# Patient Record
Sex: FEMALE | HISPANIC OR LATINO
[De-identification: names, ages, dates, MRNs, and addresses within clinical notes are randomized per-mention and may not be internally consistent; named-entity substitution may affect disease eponyms.]

---

## 2022-04-04 PROBLEM — Z00.00 ENCOUNTER FOR PREVENTIVE HEALTH EXAMINATION: Status: ACTIVE | Noted: 2022-04-04

## 2022-04-06 ENCOUNTER — APPOINTMENT (OUTPATIENT)
Dept: SLEEP CENTER | Facility: HOME HEALTH | Age: 32
End: 2022-04-06
Payer: COMMERCIAL

## 2022-04-06 ENCOUNTER — OUTPATIENT (OUTPATIENT)
Dept: OUTPATIENT SERVICES | Facility: HOSPITAL | Age: 32
LOS: 1 days | End: 2022-04-06
Payer: COMMERCIAL

## 2022-04-06 PROCEDURE — 95800 SLP STDY UNATTENDED: CPT

## 2022-04-06 PROCEDURE — 95800 SLP STDY UNATTENDED: CPT | Mod: 26

## 2022-04-07 DIAGNOSIS — G47.33 OBSTRUCTIVE SLEEP APNEA (ADULT) (PEDIATRIC): ICD-10-CM

## 2023-01-13 ENCOUNTER — EMERGENCY (EMERGENCY)
Facility: HOSPITAL | Age: 33
LOS: 1 days | Discharge: HOME | End: 2023-01-13
Admitting: STUDENT IN AN ORGANIZED HEALTH CARE EDUCATION/TRAINING PROGRAM
Payer: OTHER MISCELLANEOUS

## 2023-01-13 VITALS
DIASTOLIC BLOOD PRESSURE: 72 MMHG | HEART RATE: 80 BPM | RESPIRATION RATE: 18 BRPM | TEMPERATURE: 97 F | WEIGHT: 119.93 LBS | SYSTOLIC BLOOD PRESSURE: 107 MMHG | OXYGEN SATURATION: 100 %

## 2023-01-13 DIAGNOSIS — M54.10 RADICULOPATHY, SITE UNSPECIFIED: ICD-10-CM

## 2023-01-13 DIAGNOSIS — R82.998 OTHER ABNORMAL FINDINGS IN URINE: ICD-10-CM

## 2023-01-13 DIAGNOSIS — M54.50 LOW BACK PAIN, UNSPECIFIED: ICD-10-CM

## 2023-01-13 DIAGNOSIS — M79.652 PAIN IN LEFT THIGH: ICD-10-CM
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
## 2023-01-13 DIAGNOSIS — G89.11 ACUTE PAIN DUE TO TRAUMA: ICD-10-CM
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
## 2023-01-13 DIAGNOSIS — S32.009A UNSPECIFIED FRACTURE OF UNSPECIFIED LUMBAR VERTEBRA, INITIAL ENCOUNTER FOR CLOSED FRACTURE: ICD-10-CM

## 2023-01-13 LAB
APPEARANCE UR: CLEAR — SIGNIFICANT CHANGE UP
BACTERIA # UR AUTO: ABNORMAL /HPF
BILIRUB UR-MCNC: NEGATIVE — SIGNIFICANT CHANGE UP
COLOR SPEC: YELLOW — SIGNIFICANT CHANGE UP
DIFF PNL FLD: ABNORMAL
EPI CELLS # UR: SIGNIFICANT CHANGE UP /HPF (ref 0–5)
GLUCOSE UR QL: NEGATIVE — SIGNIFICANT CHANGE UP
KETONES UR-MCNC: 15 MG/DL
LEUKOCYTE ESTERASE UR-ACNC: ABNORMAL
NITRITE UR-MCNC: POSITIVE
PH UR: 6 — SIGNIFICANT CHANGE UP (ref 5–8)
PROT UR-MCNC: NEGATIVE MG/DL — SIGNIFICANT CHANGE UP
RBC CASTS # UR COMP ASSIST: ABNORMAL /HPF
SP GR SPEC: 1.02 — SIGNIFICANT CHANGE UP (ref 1–1.03)
UROBILINOGEN FLD QL: 0.2 E.U./DL — SIGNIFICANT CHANGE UP
WBC UR QL: ABNORMAL /HPF

## 2023-01-13 PROCEDURE — 72100 X-RAY EXAM L-S SPINE 2/3 VWS: CPT | Mod: 26

## 2023-01-13 PROCEDURE — 99284 EMERGENCY DEPT VISIT MOD MDM: CPT

## 2023-01-13 PROCEDURE — 99282 EMERGENCY DEPT VISIT SF MDM: CPT

## 2023-01-13 PROCEDURE — 73502 X-RAY EXAM HIP UNI 2-3 VIEWS: CPT | Mod: 26,LT

## 2023-01-13 PROCEDURE — 99285 EMERGENCY DEPT VISIT HI MDM: CPT

## 2023-01-13 PROCEDURE — 72100 X-RAY EXAM L-S SPINE 2/3 VWS: CPT

## 2023-01-13 PROCEDURE — 81001 URINALYSIS AUTO W/SCOPE: CPT

## 2023-01-13 PROCEDURE — 73502 X-RAY EXAM HIP UNI 2-3 VIEWS: CPT

## 2023-01-13 RX ORDER — LIDOCAINE 4 G/100G
1 CREAM TOPICAL ONCE
Refills: 0 | Status: COMPLETED | OUTPATIENT
Start: 2023-01-13 | End: 2023-01-13

## 2023-01-13 RX ORDER — OXYCODONE AND ACETAMINOPHEN 5; 325 MG/1; MG/1
1 TABLET ORAL
Qty: 20 | Refills: 0
Start: 2023-01-13

## 2023-01-13 RX ORDER — OXYCODONE AND ACETAMINOPHEN 5; 325 MG/1; MG/1
1 TABLET ORAL ONCE
Refills: 0 | Status: DISCONTINUED | OUTPATIENT
Start: 2023-01-13 | End: 2023-01-13

## 2023-01-13 RX ADMIN — OXYCODONE AND ACETAMINOPHEN 1 TABLET(S): 5; 325 TABLET ORAL at 12:11

## 2023-01-13 RX ADMIN — LIDOCAINE 1 PATCH: 4 CREAM TOPICAL at 12:11

## 2023-01-13 RX ADMIN — OXYCODONE AND ACETAMINOPHEN 1 TABLET(S): 5; 325 TABLET ORAL at 14:51

## 2023-01-13 NOTE — ED PROVIDER NOTE - CLINICAL SUMMARY MEDICAL DECISION MAKING FREE TEXT BOX
Mechanical fall with low back injury.  No head strike/headache or other suggestion of head injury, no neck pain or tenderness to suggest cervical injury.  No evidence of thoracoabdominal injuries.  Direct trauma to low back with left paraspinal tenderness, XR c/w transverse processes fractures. Neurologically intact. Neurosurg evaluated and cleared for DC with pain control.      UA was not ordered by me (RN ordered) as pt has no UTI symptoms, but the UA was positive for nit and LE, 5-10 WBC.  Preg test neg. Pt admits to some whitish vaginal DC that just started today and she thinks it is the beginning of a yeast infection. She declined a pelvic exam as she has severe back pain from her fractures, and she is not concerned about having an STI; she says she will self-treat with Monistat and follow up with her gynecologist.

## 2023-01-13 NOTE — ED PROVIDER NOTE - PATIENT PORTAL LINK FT
You can access the FollowMyHealth Patient Portal offered by [facility] by registering at the following website: [email or web address]. By joining [employer]’s FollowMyHealth portal, you will also be able to view your health information using other applications (apps) compatible with our system.

## 2023-01-13 NOTE — ED PROVIDER NOTE - PHYSICAL EXAMINATION
CONSTITUTIONAL: NAD   SKIN: Normal color and turgor.    HEAD: NC/AT.  EYES: Conjunctiva clear. EOMI. PERRL.    ENT: Airway clear. Normal voice.   RESPIRATORY:  Normal work of breathing. Lungs CTAB.  CARDIOVASCULAR:  RRR, S1S2. No M/R/G.      GI:  Abdomen soft, nontender.    MSK: Neck supple. No midline neck tenderness.  + left sided paraspinal lumbar tenderness.  difficutly flexing left hip due to back pain, minimal hip discomfort.    NEURO: Alert; CN: grossly intact. Speech clear.  5/5 strength, SILT. Gait antalgic.

## 2023-01-13 NOTE — ED PROVIDER NOTE - NSFOLLOWUPINSTRUCTIONS_ED_ALL_ED_FT
Use Monistat for possible yeast infection. Follow up with your gynecologist.  Follow up with spine surgeon DR BARTON next week, call today to schedule.    Return to the Emergency Department if you have any new or worsening symptoms, or if you have any concerns.  ==================    Transverse Process Fracture    WHAT YOU NEED TO KNOW:    A transverse process fracture is a break or crack in the back part of a vertebra. Your healthcare provider will give you specific instructions. This depends on where the fracture happened and how severe it is.      DISCHARGE INSTRUCTIONS:    Call your local emergency number (911 in the ) or have someone else call if:   •You feel lightheaded, short of breath, or have chest pain.      •You cough up blood.      •You cannot move your arms or legs.      Return to the emergency department if:   •Your arm or leg feels warm, tender, and painful. It may look swollen and red.      •You have pain in your abdomen.      •You are dizzy, weak, pale, or faint.      •You are confused or have trouble thinking clearly.      •You see blood in your urine, or your urine is dark.      •You have new problems urinating or having bowel movements.      •You have severe pain.      Call your doctor or orthopedist if:   •You cannot sleep or rest because of pain.      •You have pain or swelling that is getting worse or does not go away.      •You have questions or concerns about your condition or care.      Medicines: You may need any of the following:  •Acetaminophen decreases pain and fever. It is available without a doctor's order. Ask how much to take and how often to take it. Follow directions. Read the labels of all other medicines you are using to see if they also contain acetaminophen, or ask your doctor or pharmacist. Acetaminophen can cause liver damage if not taken correctly.      •NSAIDs, such as ibuprofen, help decrease swelling, pain, and fever. NSAIDs can cause stomach bleeding or kidney problems in certain people. If you take blood thinner medicine, always ask your healthcare provider if NSAIDs are safe for you. Always read the medicine label and follow directions.      •Prescription pain medicine may be given. Ask your healthcare provider how to take this medicine safely. Some prescription pain medicines contain acetaminophen. Do not take other medicines that contain acetaminophen without talking to your healthcare provider. Too much acetaminophen may cause liver damage. Prescription pain medicine may cause constipation. Ask your healthcare provider how to prevent or treat constipation.       •Muscle relaxers may be needed.      •Take your medicine as directed. Contact your healthcare provider if you think your medicine is not helping or if you have side effects. Tell your provider if you are allergic to any medicine. Keep a list of the medicines, vitamins, and herbs you take. Include the amounts, and when and why you take them. Bring the list or the pill bottles to follow-up visits. Carry your medicine list with you in case of an emergency.      Manage your symptoms: The fracture may take weeks or months to heal. The following can help manage your symptoms:  •Rest as directed. Rest will help the fracture heal. Avoid activities that can cause pain or more injury. Begin normal, slow movements as directed by your healthcare provider. Your provider will tell you when it is okay to drive and to return to your normal activities. He or she can also help you make a plan to return to sports, if needed.      •Apply ice to help decrease swelling and pain. Use an ice pack, or put crushed ice in a plastic bag. Cover it with a towel before you apply it to your skin. Apply ice for 15 to 20 minutes every hour or as directed.      •Wear a back brace, if directed. A back brace may help you feel more comfortable while the fracture heals. Your healthcare provider will tell you the kind of brace to use. He or she will tell you how long to wear it each day, and for how many days.      •Go to physical therapy, if directed. A physical therapist teaches you exercises to help improve movement and strength, and to decrease pain.      Prevent more injury:   •Strengthen your muscles and bones. Weight-bearing activities such as walking helps strengthen bones. Activities that help strengthen muscles, such as weightlifting, help protect your bones. Your healthcare provider can help you create a safe physical activity plan. He or she can also help you manage any condition that weakens your bones, such as osteoporosis.      •Reach or maintain a healthy weight. Extra weight puts more stress on your back. Your healthcare provider can help you create a safe weight loss plan, if needed.      •Get more calcium and vitamin D, as directed. Calcium and vitamin D work together to make bones stronger. Good sources of calcium are milk, cheese, broccoli, tofu, almonds, and canned salmon or sardines. Vitamin D is in fish oils, some vegetables, and fortified milk, cereal, or bread. Vitamin D is also formed in the skin when it is exposed to the sun.         •Lower your risk for injuries or accidents. Always wear a seatbelt when you are in a car or other vehicle. Keep walkways in your home clear so you will not trip as you walk. Use handrails when you go up or down stairs.      •Play sports safely. Wear proper protective gear. Take breaks when you practice if your sport involves repeating the same motion. Use proper body mechanics for your sport.      Follow up with your doctor or orthopedist as directed: You may need to return for x-rays or other tests. Write down your questions so you remember to ask them during your visits.

## 2023-01-13 NOTE — CONSULT NOTE ADULT - SUBJECTIVE AND OBJECTIVE BOX
HISTORY OF PRESENT ILLNESS:   32 F no sig PMH presents to ER s/p mechanical fall with injury to low back. 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 No LOC or neck pain. Reports severe low back pain with LLE radiculopathy. Reports difficulty sitting due to pain. Able to void normally. Denies numbness/tingling, saddle anesthesia, bowel/bladder incontinence.       Allergies    No Known Allergies    Intolerances        MEDICATIONS:  Antibiotics:    Neuro:    Anticoagulation:    OTHER:    IVF:      Vital Signs Last 24 Hrs  T(C): 36.2 (2023 10:59), Max: 36.2 (2023 10:59)  T(F): 97.2 (2023 10:59), Max: 97.2 (2023 10:59)  HR: 80 (2023 10:59) (80 - 80)  BP: 107/72 (2023 10:59) (107/72 - 107/72)  BP(mean): --  RR: 18 (2023 10:59) (18 - 18)  SpO2: 100% (2023 10:59) (100% - 100%)    Parameters below as of 2023 10:59  Patient On (Oxygen Delivery Method): room air        PHYSICAL EXAM:  GEN: standing, appears uncomfortable  NEURO: AOx3. BL UE 5/5, BL LE 5/5 throughout. sensation intact. +L paraspinal tenderness to palpation  CV: RRR +S1/S2  PULM: CTAB  GI: Abd soft, NT/ND  EXT: ext warm, dry, nontender    LABS:            Urinalysis Basic - ( 2023 12:06 )    Color: Yellow / Appearance: Clear / S.025 / pH: x  Gluc: x / Ketone: 15 mg/dL  / Bili: Negative / Urobili: 0.2 E.U./dL   Blood: x / Protein: NEGATIVE mg/dL / Nitrite: POSITIVE   Leuk Esterase: Trace / RBC: 5-10 /HPF / WBC 5-10 /HPF   Sq Epi: x / Non Sq Epi: 0-5 /HPF / Bacteria: Many /HPF      CULTURES:      RADIOLOGY & ADDITIONAL STUDIES:  < from: Xray Lumbar Spine AP + Lateral (23 @ 12:46) >  Nondisplaced fractures left L1-3 transverse processes are suspected. No   compression deformity or malalignment.    < end of copied text >        Assessment:  32 F s/p fall with suspected L1-3 transverse process fractures    Plan:  - no acute neurosurgical intervention required  - pain control  - no activity restrictions  - can f/u with Dr. Mike outpatient    Discussed with pt, Dr. Mike, ROSA Davies

## 2023-01-13 NOTE — ED PROVIDER NOTE - PROGRESS NOTE DETAILS
neurosurg/spine consult called, will see shortly. seen by neurosurg: no surgical intervention. no brace.  no activity restriction. pain control. follow up next week with dr pagan.

## 2023-01-13 NOTE — ED ADULT TRIAGE NOTE - CHIEF COMPLAINT QUOTE
Pt c/o lower back pain radiating down left leg s/p fall at work. Denies head injury, LOC, numbness/tingling, bowel/bladder dysfunction.

## 2023-01-13 NOTE — ED ADULT NURSE NOTE - OBJECTIVE STATEMENT
31 y/o female s/p fall at work c/o lower back pain radiating down left leg. Denies head injury, LOC, numbness/tingling, bowel/bladder dysfunction.

## 2023-01-13 NOTE — ED PROVIDER NOTE - OBJECTIVE STATEMENT
32 f generally healthy, slipped on wet floor at work this morning, landed on back. c/o severe pain left lumbar region and has pain to anterior left thigh.  no head strike or LOC, no neck pain, no CP/SOB, no abd pain.  has not tried to use bathroom yet but no bladder incontinence.  no saddle anesthesia.  severe pain, unable to sit; has to stand bearing weight on right leg for comfort.    took Plan B this week emergency contraception

## 2023-01-13 NOTE — ED PROVIDER NOTE - CARE PROVIDER_API CALL
Dean Mike; MS)  Neurosurgery  [address] [place] [address]  Phone: [phone]  Fax: [phone]  Follow Up Time: 4-6 Days

## 2023-01-18 ENCOUNTER — APPOINTMENT (OUTPATIENT)
Dept: SPINE | Facility: CLINIC | Age: 33
End: 2023-01-18
Payer: COMMERCIAL

## 2023-01-18 DIAGNOSIS — K59.00 CONSTIPATION, UNSPECIFIED: ICD-10-CM

## 2023-01-18 PROCEDURE — 99203 OFFICE O/P NEW LOW 30 MIN: CPT | Mod: 95

## 2023-01-18 RX ORDER — OXYCODONE AND ACETAMINOPHEN 5; 325 MG/1; MG/1
5-325 TABLET ORAL
Qty: 60 | Refills: 0 | Status: ACTIVE | COMMUNITY
Start: 2023-01-18 | End: 1900-01-01

## 2023-01-18 RX ORDER — DOCUSATE SODIUM 100 MG/1
100 CAPSULE ORAL 3 TIMES DAILY
Qty: 30 | Refills: 0 | Status: ACTIVE | COMMUNITY
Start: 2023-01-18 | End: 1900-01-01

## 2023-01-18 RX ORDER — DOCUSATE SODIUM 100 MG/1
100 CAPSULE ORAL 3 TIMES DAILY
Qty: 90 | Refills: 0 | Status: ACTIVE | COMMUNITY
Start: 2023-01-18 | End: 1900-01-01

## 2023-01-18 NOTE — REASON FOR VISIT
[Follow-Up Visit] : a follow-up visit for [FreeTextEntry2] : lumbar transverse fx in 3 levels 2/2 traumatic injury at work

## 2023-01-18 NOTE — PHYSICAL EXAM
[de-identified] : ACC: [id number] EXAM: XR LS SPINE AP LAT 2-3 VIEWS\par \par PROCEDURE DATE: 01/13/2023\par \par \par \par INTERPRETATION: CLINICAL INFORMATION: Status post fall, back pain\par \par EXAM: AP, lateral radiographs of the lumbar spine and lateral magnified view of the lumbosacral junction.\par \par COMPARISON: None.\par \par FINDINGS:\par Vertebral bodies are preserved in height.\par Nondisplaced fractures questioned of the left L1, L2 and L3 transverse processes.\par Alignment is anatomic without listhesis.\par Disc spaces are preserved and the sacroiliac joints are unremarkable.\par \par IMPRESSION:\par \par Nondisplaced fractures left L1-3 transverse processes are suspected. No compression deformity or malalignment.\par \par --- End of Report ---\par \par \par \par \par \par MIRYAM PATIÑO MD; Attending Radiologist\par This document has been electronically signed. Jan 13 2023 1:27PM

## 2023-01-18 NOTE — HISTORY OF PRESENT ILLNESS
[Home] : at home, [unfilled] , at the time of the visit. [Medical Office: ([facility])___] : at the medical office located in  [Verbal consent obtained from patient] : the patient, [unfilled] [de-identified] : ED consult (1/13/2023)\par 32 F no significant PMH presents to ER s/p mechanical fall with injury to low back. 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 No LOC or neck pain. Reports severe low back pain with LLE radiculopathy. Reports difficulty sitting due to pain. Able to void normally. Denies numbness/tingling, saddle anesthesia, bowel/bladder incontinence. Xray in ED showed multiple level lumbar transverse fractures. neuro sx consulted and recommended f/u outpatient.\par \par Today she reports persistent aching/dull pain on her back worsening by deep breathing/lying on L side or back but denies LE weakness/pain/paresthesia, BB incontinence. She reports acute constipation for the past couple of days since she has been taking Percocet but denies abdominal pain/n/v/fever/chills.\par

## 2023-01-18 NOTE — ASSESSMENT
[FreeTextEntry1] : PLAN\par - continue pain meds/colace for constipation for 6 weeks\par - light activity without any restriction\par - f/u in 6 weeks if patient persists or sooner for worsening symptoms (will repeat Xray at that time prior to further diagnostic image)

## 2023-05-04 ENCOUNTER — NON-APPOINTMENT (OUTPATIENT)
Age: 33
End: 2023-05-04

## 2023-05-08 ENCOUNTER — NON-APPOINTMENT (OUTPATIENT)
Age: 33
End: 2023-05-08

## 2023-05-10 ENCOUNTER — NON-APPOINTMENT (OUTPATIENT)
Age: 33
End: 2023-05-10

## 2023-05-10 ENCOUNTER — RESULT REVIEW (OUTPATIENT)
Age: 33
End: 2023-05-10

## 2023-05-10 ENCOUNTER — OUTPATIENT (OUTPATIENT)
Dept: OUTPATIENT SERVICES | Facility: HOSPITAL | Age: 33
LOS: 1 days | End: 2023-05-10
Payer: OTHER MISCELLANEOUS

## 2023-05-10 ENCOUNTER — APPOINTMENT (OUTPATIENT)
Dept: SPINE | Facility: CLINIC | Age: 33
End: 2023-05-10
Payer: OTHER MISCELLANEOUS

## 2023-05-10 VITALS
OXYGEN SATURATION: 99 % | HEART RATE: 81 BPM | BODY MASS INDEX: 23.04 KG/M2 | HEIGHT: 63 IN | WEIGHT: 130 LBS | SYSTOLIC BLOOD PRESSURE: 97 MMHG | RESPIRATION RATE: 17 BRPM | DIASTOLIC BLOOD PRESSURE: 64 MMHG | TEMPERATURE: 98.3 F

## 2023-05-10 DIAGNOSIS — E03.9 HYPOTHYROIDISM, UNSPECIFIED: ICD-10-CM

## 2023-05-10 PROCEDURE — 72110 X-RAY EXAM L-2 SPINE 4/>VWS: CPT | Mod: 26

## 2023-05-10 PROCEDURE — 99213 OFFICE O/P EST LOW 20 MIN: CPT

## 2023-05-10 PROCEDURE — 72110 X-RAY EXAM L-2 SPINE 4/>VWS: CPT

## 2023-05-10 NOTE — HISTORY OF PRESENT ILLNESS
[de-identified] : work injury\par date: 1/13/2023\par \par ED consult (1/13/2023)\par 32 F no significant PMH presents to ER s/p mechanical fall with injury to low back. 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 No LOC or neck pain. Reports severe low back pain with LLE radiculopathy. Reports difficulty sitting due to pain. Able to void normally. Denies numbness/tingling, saddle anesthesia, bowel/bladder incontinence. Xray in ED showed multiple level lumbar transverse fractures. neuro sx consulted and recommended f/u outpatient.\par \par 1/18/2023 telehealth visit\par she reports persistent aching/dull pain on her back worsening by deep breathing/lying on L side or back but denies LE weakness/pain/paresthesia, BB incontinence. She reports acute constipation for the past couple of days since she has been taking Percocet but denies abdominal pain/n/v/fever/chills.\par Images were reviewed with the patient, advised to continue conservative management with light activity only due to fractures for 6 months.\par \par Today she returns reports slightly improving back pain but reports pain recurred after prolonged sitting and lifting/bending. She denies LE weakness/paresthesia/weakness, BB dysfunction. Pain is alleviated by resting/lying down.\par

## 2023-05-10 NOTE — PHYSICAL EXAM
[Normal] : Gait: normal [] : Motor: [UE Motor Strength NL] : Motor strength of the bilateral upper extremities is normal [LE Motor Strength NL] : Motor strength of the bilateral lower extremities was normal [2+] : left ankle jerk 2+ [de-identified] : Xray L-spine 4 views today in PACS showed stable L sided transverse fractures noted @ L1-3 which has been improving compared to the last Xray.

## 2023-05-10 NOTE — ASSESSMENT
[FreeTextEntry1] : Xray reviewed today. Given her persistent residual back pain, she is advised to take half day work only to minimize worsening symptoms (prolonged sitting/bending/lifting) until the summer break.\par \par PLAN\par - physical therapy for pain modality/stretching exercise\par - limited work hours until summer break\par - RTC for new/worsening symptoms

## 2023-08-24 ENCOUNTER — NON-APPOINTMENT (OUTPATIENT)
Age: 33
End: 2023-08-24

## 2023-08-24 DIAGNOSIS — S32.009S UNSPECIFIED FRACTURE OF UNSPECIFIED LUMBAR VERTEBRA, SEQUELA: ICD-10-CM

## 2023-08-24 DIAGNOSIS — Z78.9 OTHER SPECIFIED HEALTH STATUS: ICD-10-CM

## 2023-08-24 NOTE — HISTORY OF PRESENT ILLNESS
[de-identified] : work injury date: 1/13/2023  ED consult (1/13/2023) 32 F no significant PMH presents to ER s/p mechanical fall with injury to low back. 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 No LOC or neck pain. Reports severe low back pain with LLE radiculopathy. Reports difficulty sitting due to pain. Able to void normally. Denies numbness/tingling, saddle anesthesia, bowel/bladder incontinence. Xray in ED showed multiple level lumbar transverse fractures. neuro sx consulted and recommended f/u outpatient.  1/18/2023 telehealth visit she reports persistent aching/dull pain on her back worsening by deep breathing/lying on L side or back but denies LE weakness/pain/paresthesia, BB incontinence. She reports acute constipation for the past couple of days since she has been taking Percocet but denies abdominal pain/n/v/fever/chills. Images were reviewed with the patient, advised to continue conservative management with light activity only due to fractures for 6 months.  5/10/23 visit Today she returns reports slightly improving back pain but reports pain recurred after prolonged sitting and lifting/bending. She denies LE weakness/paresthesia/weakness, BB dysfunction. Pain is alleviated by resting/lying down. Xray showed  properly healing transverse process fractures. Plan was made to continue PT for pain modality.  Today she returns reporting residual back pain which has been improving with PT and denies any new/worsening focal neuro deficits.

## 2023-09-01 NOTE — ED ADULT NURSE NOTE - HIV OFFER
Opt out Consent: The rationale for the repair was explained to the patient and consent was obtained. The risks, benefits and alternatives to therapy were discussed in detail. Specifically, the risks of infection, scarring, bleeding, prolonged wound healing, incomplete removal, allergy to anesthesia, nerve injury and recurrence were addressed. Prior to the procedure, the treatment site was clearly identified and confirmed by the patient. All components of Universal Protocol/PAUSE Rule completed.

## 2024-04-08 ENCOUNTER — APPOINTMENT (OUTPATIENT)
Dept: NEUROSURGERY | Facility: CLINIC | Age: 34
End: 2024-04-08
Payer: OTHER MISCELLANEOUS

## 2024-04-08 VITALS
BODY MASS INDEX: 22.32 KG/M2 | RESPIRATION RATE: 18 BRPM | HEART RATE: 69 BPM | WEIGHT: 126 LBS | SYSTOLIC BLOOD PRESSURE: 94 MMHG | OXYGEN SATURATION: 98 % | HEIGHT: 63 IN | DIASTOLIC BLOOD PRESSURE: 66 MMHG

## 2024-04-08 PROCEDURE — 99215 OFFICE O/P EST HI 40 MIN: CPT

## 2024-04-08 NOTE — ASSESSMENT
[FreeTextEntry1] : 34 year old female s/p mechanical fall with L1-3 TP fractures last year. Unable to get CT/xray at this time due to possible pregnancy Proceed with PT, script given CT lumbar/sacrum when able  RTO after CT complete   I, Dr. Daniel, personally performed the evaluation and management (E/M) services for this new patient.  That E/M includes conducting the initial examination, assessing all conditions, and establishing the plan of care.  Today, my ACP, Calli Morocho, was here to observe my evaluation and management services for this patient to be followed going forward.    Patient and patient's family verbalize agreement and understanding with plan of care.

## 2024-04-08 NOTE — HISTORY OF PRESENT ILLNESS
[de-identified] : work injury date: 1/13/2023  ED consult (1/13/2023) 32 F no significant PMH presents to ER s/p mechanical fall with injury to low back. 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 No LOC or neck pain. Reports severe low back pain with LLE radiculopathy. Reports difficulty sitting due to pain. Able to void normally. Denies numbness/tingling, saddle anesthesia, bowel/bladder incontinence. Xray in ED showed multiple level lumbar transverse fractures. neuro sx consulted and recommended f/u outpatient.  1/18/2023 telehealth visit she reports persistent aching/dull pain on her back worsening by deep breathing/lying on L side or back but denies LE weakness/pain/paresthesia, BB incontinence. She reports acute constipation for the past couple of days since she has been taking Percocet but denies abdominal pain/n/v/fever/chills. Images were reviewed with the patient, advised to continue conservative management with light activity only due to fractures for 6 months.  5/10/23 visit Today she returns reports slightly improving back pain but reports pain recurred after prolonged sitting and lifting/bending. She denies LE weakness/paresthesia/weakness, BB dysfunction. Pain is alleviated by resting/lying down. Xray showed  properly healing transverse process fractures. Plan was made to continue PT for pain modality.  She comes in today to establish care for persistent back pain radiating to her bilateral sacrum. She denies changes to bowel/bladder incontinence.